# Patient Record
Sex: FEMALE | Race: ASIAN | NOT HISPANIC OR LATINO | URBAN - METROPOLITAN AREA
[De-identification: names, ages, dates, MRNs, and addresses within clinical notes are randomized per-mention and may not be internally consistent; named-entity substitution may affect disease eponyms.]

---

## 2019-04-29 VITALS
TEMPERATURE: 98 F | RESPIRATION RATE: 16 BRPM | WEIGHT: 197.98 LBS | HEIGHT: 64 IN | OXYGEN SATURATION: 98 % | SYSTOLIC BLOOD PRESSURE: 153 MMHG | DIASTOLIC BLOOD PRESSURE: 70 MMHG | HEART RATE: 84 BPM

## 2019-04-29 RX ORDER — CHLORHEXIDINE GLUCONATE 213 G/1000ML
1 SOLUTION TOPICAL ONCE
Qty: 0 | Refills: 0 | Status: DISCONTINUED | OUTPATIENT
Start: 2019-04-30 | End: 2019-04-30

## 2019-04-29 NOTE — H&P ADULT - ASSESSMENT
62yo F, FHx early CAD (brothers x 2 in 50s), PMHx HTN, Hyperlipidemia, hypothyroidism, GERD who presents to [facility] for recommended cardiac cath with possible intervention in the setting of risk factors, class III anginal symptoms and abnormal NST.    ASA 3, Mallampati 2    OF NOTE: Patient is on ASA 81mg at home which she took this AM and has not missed any recent doses. She will receive plavix 600mg prior to procedure. NS @ 75cc/hr pre-cath fluids.    Risks & benefits of procedure and alternative therapy have been explained to the patient including but not limited to: allergic reaction, bleeding w/possible need for blood transfusion, infection, renal and vascular compromise, limb damage, arrhythmia, stroke, vessel dissection/perforation, Myocardial infarction, emergent CABG. Informed consent obtained and in chart.

## 2019-04-29 NOTE — H&P ADULT - HISTORY OF PRESENT ILLNESS
SKELETON    62 yo female, FHx CAD, PMHx HTN, Hyperlipidemia, hypothyroidism presented to cardiologist endorsing chest pain and SOB with exertion. Denies....    NST on 3/26/19 showed small area of anterior wall ischemia. EF 68%.     In light of pt's risk factors, CCS Class ... anginal symptoms, and abnormal NST pt presents for recommended cardiac catheterization with possible intervention. Pt to bring in medications    62 yo female, FHx early CAD (brothers x 2 in 50s), PMHx HTN, Hyperlipidemia, hypothyroidism, GERD presented to cardiologist endorsing chest pain, SOB, and palpitations with exertion of 2 blocks worsening over the last few months. Pt endorses difficulty with SOB while flat on her back, but can breath normally while on her side. Denies dizziness, nausea, syncope, PND, claudication. NST on 3/26/19 showed small area of anterior wall ischemia. EF 68%.     In light of pt's risk factors, CCS Class 3 anginal symptoms, and abnormal NST pt presents for recommended cardiac catheterization with possible intervention. 62yo F, FHx early CAD (brothers x 2 in 50s), PMHx HTN, Hyperlipidemia, hypothyroidism, GERD who presented to her cardiologist endorsing chest pain, SOB, and palpitations with exertion of 2 blocks worsening over the last few months. Pt endorses difficulty with SOB while flat on her back, but can breath normally while on her side. Denies dizziness, nausea, syncope, PND, claudication. NST on 3/26/19 showed small area of anterior wall ischemia. EF 68%.     In light of pt's risk factors, CCS Class 3 anginal symptoms, and abnormal NST pt presents for recommended cardiac catheterization with possible intervention.

## 2019-04-29 NOTE — H&P ADULT - NSHPLABSRESULTS_GEN_ALL_CORE
13.4   6.72  )-----------( 149      ( 30 Apr 2019 08:01 )             41.8                   EKG: 13.4   6.72  )-----------( 149      ( 30 Apr 2019 08:01 )             41.8               PT/INR - ( 30 Apr 2019 08:01 )   PT: 11.7 sec;   INR: 1.03          PTT - ( 30 Apr 2019 08:01 )  PTT:30.7 sec              EKG: SR @ 69bpm with TWI in III 13.4   6.72  )-----------( 149      ( 30 Apr 2019 08:01 )             41.8       04-30    142  |  106  |  14  ----------------------------<  113<H>  4.1   |  24  |  0.82    Ca    9.8      30 Apr 2019 08:01    TPro  8.3  /  Alb  4.4  /  TBili  0.6  /  DBili  x   /  AST  19  /  ALT  19  /  AlkPhos  100  04-30      PT/INR - ( 30 Apr 2019 08:01 )   PT: 11.7 sec;   INR: 1.03          PTT - ( 30 Apr 2019 08:01 )  PTT:30.7 sec    CARDIAC MARKERS ( 30 Apr 2019 08:01 )  x     / x     / 88 U/L / x     / 1.4 ng/mL          EKG: SR @ 69bpm with TWI in III

## 2019-04-30 ENCOUNTER — OUTPATIENT (OUTPATIENT)
Dept: OUTPATIENT SERVICES | Facility: HOSPITAL | Age: 64
LOS: 1 days | Discharge: NURSING, REHAB OR LONG-TERM CARE | End: 2019-04-30
Payer: COMMERCIAL

## 2019-04-30 LAB
ALBUMIN SERPL ELPH-MCNC: 4.4 G/DL — SIGNIFICANT CHANGE UP (ref 3.3–5)
ALP SERPL-CCNC: 100 U/L — SIGNIFICANT CHANGE UP (ref 40–120)
ALT FLD-CCNC: 19 U/L — SIGNIFICANT CHANGE UP (ref 10–45)
ANION GAP SERPL CALC-SCNC: 12 MMOL/L — SIGNIFICANT CHANGE UP (ref 5–17)
APTT BLD: 30.7 SEC — SIGNIFICANT CHANGE UP (ref 27.5–36.3)
AST SERPL-CCNC: 19 U/L — SIGNIFICANT CHANGE UP (ref 10–40)
BASOPHILS # BLD AUTO: 0.04 K/UL — SIGNIFICANT CHANGE UP (ref 0–0.2)
BASOPHILS NFR BLD AUTO: 0.6 % — SIGNIFICANT CHANGE UP (ref 0–2)
BILIRUB SERPL-MCNC: 0.6 MG/DL — SIGNIFICANT CHANGE UP (ref 0.2–1.2)
BUN SERPL-MCNC: 14 MG/DL — SIGNIFICANT CHANGE UP (ref 7–23)
CALCIUM SERPL-MCNC: 9.8 MG/DL — SIGNIFICANT CHANGE UP (ref 8.4–10.5)
CHLORIDE SERPL-SCNC: 106 MMOL/L — SIGNIFICANT CHANGE UP (ref 96–108)
CHOLEST SERPL-MCNC: 158 MG/DL — SIGNIFICANT CHANGE UP (ref 10–199)
CK MB CFR SERPL CALC: 1.4 NG/ML — SIGNIFICANT CHANGE UP (ref 0–6.7)
CK SERPL-CCNC: 88 U/L — SIGNIFICANT CHANGE UP (ref 25–170)
CO2 SERPL-SCNC: 24 MMOL/L — SIGNIFICANT CHANGE UP (ref 22–31)
CREAT SERPL-MCNC: 0.82 MG/DL — SIGNIFICANT CHANGE UP (ref 0.5–1.3)
EOSINOPHIL # BLD AUTO: 0.25 K/UL — SIGNIFICANT CHANGE UP (ref 0–0.5)
EOSINOPHIL NFR BLD AUTO: 3.7 % — SIGNIFICANT CHANGE UP (ref 0–6)
GLUCOSE SERPL-MCNC: 113 MG/DL — HIGH (ref 70–99)
HBA1C BLD-MCNC: 5.7 % — HIGH (ref 4–5.6)
HCT VFR BLD CALC: 41.8 % — SIGNIFICANT CHANGE UP (ref 34.5–45)
HCV AB S/CO SERPL IA: 0.12 S/CO — SIGNIFICANT CHANGE UP
HCV AB SERPL-IMP: SIGNIFICANT CHANGE UP
HDLC SERPL-MCNC: 68 MG/DL — SIGNIFICANT CHANGE UP
HGB BLD-MCNC: 13.4 G/DL — SIGNIFICANT CHANGE UP (ref 11.5–15.5)
IMM GRANULOCYTES NFR BLD AUTO: 0.1 % — SIGNIFICANT CHANGE UP (ref 0–1.5)
INR BLD: 1.03 — SIGNIFICANT CHANGE UP (ref 0.88–1.16)
LIPID PNL WITH DIRECT LDL SERPL: 65 MG/DL — SIGNIFICANT CHANGE UP
LYMPHOCYTES # BLD AUTO: 2.17 K/UL — SIGNIFICANT CHANGE UP (ref 1–3.3)
LYMPHOCYTES # BLD AUTO: 32.3 % — SIGNIFICANT CHANGE UP (ref 13–44)
MCHC RBC-ENTMCNC: 29.6 PG — SIGNIFICANT CHANGE UP (ref 27–34)
MCHC RBC-ENTMCNC: 32.1 GM/DL — SIGNIFICANT CHANGE UP (ref 32–36)
MCV RBC AUTO: 92.5 FL — SIGNIFICANT CHANGE UP (ref 80–100)
MONOCYTES # BLD AUTO: 0.77 K/UL — SIGNIFICANT CHANGE UP (ref 0–0.9)
MONOCYTES NFR BLD AUTO: 11.5 % — SIGNIFICANT CHANGE UP (ref 2–14)
NEUTROPHILS # BLD AUTO: 3.48 K/UL — SIGNIFICANT CHANGE UP (ref 1.8–7.4)
NEUTROPHILS NFR BLD AUTO: 51.8 % — SIGNIFICANT CHANGE UP (ref 43–77)
NRBC # BLD: 0 /100 WBCS — SIGNIFICANT CHANGE UP (ref 0–0)
PLATELET # BLD AUTO: 149 K/UL — LOW (ref 150–400)
POTASSIUM SERPL-MCNC: 4.1 MMOL/L — SIGNIFICANT CHANGE UP (ref 3.5–5.3)
POTASSIUM SERPL-SCNC: 4.1 MMOL/L — SIGNIFICANT CHANGE UP (ref 3.5–5.3)
PROT SERPL-MCNC: 8.3 G/DL — SIGNIFICANT CHANGE UP (ref 6–8.3)
PROTHROM AB SERPL-ACNC: 11.7 SEC — SIGNIFICANT CHANGE UP (ref 10–12.9)
RBC # BLD: 4.52 M/UL — SIGNIFICANT CHANGE UP (ref 3.8–5.2)
RBC # FLD: 12.5 % — SIGNIFICANT CHANGE UP (ref 10.3–14.5)
SODIUM SERPL-SCNC: 142 MMOL/L — SIGNIFICANT CHANGE UP (ref 135–145)
TOTAL CHOLESTEROL/HDL RATIO MEASUREMENT: 2.3 RATIO — LOW (ref 3.3–7.1)
TRIGL SERPL-MCNC: 123 MG/DL — SIGNIFICANT CHANGE UP (ref 10–149)
WBC # BLD: 6.72 K/UL — SIGNIFICANT CHANGE UP (ref 3.8–10.5)
WBC # FLD AUTO: 6.72 K/UL — SIGNIFICANT CHANGE UP (ref 3.8–10.5)

## 2019-04-30 PROCEDURE — 80061 LIPID PANEL: CPT

## 2019-04-30 PROCEDURE — 85025 COMPLETE CBC W/AUTO DIFF WBC: CPT

## 2019-04-30 PROCEDURE — C1769: CPT

## 2019-04-30 PROCEDURE — C1894: CPT

## 2019-04-30 PROCEDURE — 82553 CREATINE MB FRACTION: CPT

## 2019-04-30 PROCEDURE — 85730 THROMBOPLASTIN TIME PARTIAL: CPT

## 2019-04-30 PROCEDURE — 83036 HEMOGLOBIN GLYCOSYLATED A1C: CPT

## 2019-04-30 PROCEDURE — 93458 L HRT ARTERY/VENTRICLE ANGIO: CPT | Mod: 26

## 2019-04-30 PROCEDURE — 36415 COLL VENOUS BLD VENIPUNCTURE: CPT

## 2019-04-30 PROCEDURE — 82550 ASSAY OF CK (CPK): CPT

## 2019-04-30 PROCEDURE — 93458 L HRT ARTERY/VENTRICLE ANGIO: CPT

## 2019-04-30 PROCEDURE — C1887: CPT

## 2019-04-30 PROCEDURE — 80053 COMPREHEN METABOLIC PANEL: CPT

## 2019-04-30 PROCEDURE — 86803 HEPATITIS C AB TEST: CPT

## 2019-04-30 PROCEDURE — 85610 PROTHROMBIN TIME: CPT

## 2019-04-30 RX ORDER — LEVOTHYROXINE SODIUM 125 MCG
1 TABLET ORAL
Qty: 0 | Refills: 0 | COMMUNITY

## 2019-04-30 RX ORDER — ASPIRIN/CALCIUM CARB/MAGNESIUM 324 MG
1 TABLET ORAL
Qty: 0 | Refills: 0 | COMMUNITY

## 2019-04-30 RX ORDER — ATORVASTATIN CALCIUM 80 MG/1
1 TABLET, FILM COATED ORAL
Qty: 0 | Refills: 0 | COMMUNITY

## 2019-04-30 RX ORDER — CLOPIDOGREL BISULFATE 75 MG/1
600 TABLET, FILM COATED ORAL ONCE
Qty: 0 | Refills: 0 | Status: COMPLETED | OUTPATIENT
Start: 2019-04-30 | End: 2019-04-30

## 2019-04-30 RX ORDER — METOPROLOL TARTRATE 50 MG
1 TABLET ORAL
Qty: 0 | Refills: 0 | COMMUNITY

## 2019-04-30 RX ORDER — RANITIDINE HYDROCHLORIDE 150 MG/1
1 TABLET, FILM COATED ORAL
Qty: 0 | Refills: 0 | COMMUNITY

## 2019-04-30 RX ORDER — SODIUM CHLORIDE 9 MG/ML
1000 INJECTION INTRAMUSCULAR; INTRAVENOUS; SUBCUTANEOUS
Qty: 0 | Refills: 0 | Status: DISCONTINUED | OUTPATIENT
Start: 2019-04-30 | End: 2019-04-30

## 2019-04-30 RX ORDER — LOSARTAN POTASSIUM 100 MG/1
1 TABLET, FILM COATED ORAL
Qty: 0 | Refills: 0 | COMMUNITY

## 2019-04-30 RX ORDER — SODIUM CHLORIDE 9 MG/ML
500 INJECTION INTRAMUSCULAR; INTRAVENOUS; SUBCUTANEOUS
Qty: 0 | Refills: 0 | Status: DISCONTINUED | OUTPATIENT
Start: 2019-04-30 | End: 2019-04-30

## 2019-04-30 RX ADMIN — SODIUM CHLORIDE 75 MILLILITER(S): 9 INJECTION INTRAMUSCULAR; INTRAVENOUS; SUBCUTANEOUS at 09:09

## 2019-04-30 RX ADMIN — CLOPIDOGREL BISULFATE 600 MILLIGRAM(S): 75 TABLET, FILM COATED ORAL at 09:07

## 2024-05-03 NOTE — PROGRESS NOTE ADULT - SUBJECTIVE AND OBJECTIVE BOX
Test Ordered: Echo /  Insurance: [facility]  /  Authorization Status: Approved:  [facility]# [id number], Exp 7/2/24       
Interventional Cardiology PA SDA Discharge Note      Patient without complaints. Ambulated and voided without difficulty.    Afebrile, vital signs stable.    Ext: Right Radial: no hematoma, no bleed, dressing C/D/I.    Pulses: Radial 2+ intact to baseline.      A/P:  62 y/o Female with a FHx early CAD (brothers x 2 in 50s), PMHx HTN, Hyperlipidemia, hypothyroidism, GERD who presented to her cardiologist endorsing chest pain, SOB, and palpitations with exertion of 2 blocks worsening over the last few months. Pt endorses difficulty with SOB while flat on her back, but can breath normally while on her side. Denies dizziness, nausea, syncope, PND, claudication. NST on 3/26/2019 showed small area of anterior wall ischemia. EF 68%. In light of pt's risk factors, CCS Class 3 anginal symptoms, and abnormal NST pt presents for recommended cardiac catheterization with possible intervention. Patient is now s/p Diagnostic Cardiac Catheterization 2019: LM: normal, LAD: luminal irregularities, LCx: luminal irregularities, RCA: normal, LVEF: 60%, LV FillinmmHg      1.	Stable for discharge as per attending Dr. Beckman after bed rest, patient voids, groin/wrist stable and 30 minutes of ambulation.  2.	Follow-up with PMD/Cardiologist Dr. Beckman in 1-2 weeks.  3.	Discharged forms signed and copies in chart.